# Patient Record
Sex: FEMALE | Race: BLACK OR AFRICAN AMERICAN
[De-identification: names, ages, dates, MRNs, and addresses within clinical notes are randomized per-mention and may not be internally consistent; named-entity substitution may affect disease eponyms.]

---

## 2020-05-05 ENCOUNTER — HOSPITAL ENCOUNTER (EMERGENCY)
Dept: HOSPITAL 72 - EMR | Age: 40
Discharge: HOME | End: 2020-05-05
Payer: MEDICAID

## 2020-05-05 VITALS — SYSTOLIC BLOOD PRESSURE: 146 MMHG | DIASTOLIC BLOOD PRESSURE: 88 MMHG

## 2020-05-05 VITALS — HEIGHT: 66 IN | BODY MASS INDEX: 25.88 KG/M2 | WEIGHT: 161 LBS

## 2020-05-05 VITALS — SYSTOLIC BLOOD PRESSURE: 131 MMHG | DIASTOLIC BLOOD PRESSURE: 78 MMHG

## 2020-05-05 DIAGNOSIS — F17.210: ICD-10-CM

## 2020-05-05 DIAGNOSIS — R53.1: Primary | ICD-10-CM

## 2020-05-05 DIAGNOSIS — R06.02: ICD-10-CM

## 2020-05-05 DIAGNOSIS — Z87.891: ICD-10-CM

## 2020-05-05 DIAGNOSIS — Z79.84: ICD-10-CM

## 2020-05-05 DIAGNOSIS — E11.9: ICD-10-CM

## 2020-05-05 DIAGNOSIS — Z90.710: ICD-10-CM

## 2020-05-05 LAB
ADD MANUAL DIFF: NO
ALBUMIN SERPL-MCNC: 3.9 G/DL (ref 3.4–5)
ALBUMIN/GLOB SERPL: 0.9 {RATIO} (ref 1–2.7)
ALP SERPL-CCNC: 132 U/L (ref 46–116)
ALT SERPL-CCNC: 21 U/L (ref 12–78)
ANION GAP SERPL CALC-SCNC: 14 MMOL/L (ref 5–15)
APPEARANCE UR: CLEAR
APTT PPP: (no result) S
AST SERPL-CCNC: 16 U/L (ref 15–37)
BASOPHILS NFR BLD AUTO: 1.1 % (ref 0–2)
BILIRUB SERPL-MCNC: 0.4 MG/DL (ref 0.2–1)
BUN SERPL-MCNC: 7 MG/DL (ref 7–18)
CALCIUM SERPL-MCNC: 10.2 MG/DL (ref 8.5–10.1)
CHLORIDE SERPL-SCNC: 99 MMOL/L (ref 98–107)
CO2 SERPL-SCNC: 24 MMOL/L (ref 21–32)
CREAT SERPL-MCNC: 0.8 MG/DL (ref 0.55–1.3)
EOSINOPHIL NFR BLD AUTO: 0.1 % (ref 0–3)
ERYTHROCYTE [DISTWIDTH] IN BLOOD BY AUTOMATED COUNT: 10.2 % (ref 11.6–14.8)
GLOBULIN SER-MCNC: 4.5 G/DL
GLUCOSE UR STRIP-MCNC: (no result) MG/DL
HCT VFR BLD CALC: 36.8 % (ref 37–47)
HGB BLD-MCNC: 13.4 G/DL (ref 12–16)
KETONES UR QL STRIP: (no result)
LEUKOCYTE ESTERASE UR QL STRIP: NEGATIVE
LYMPHOCYTES NFR BLD AUTO: 21.1 % (ref 20–45)
MCV RBC AUTO: 90 FL (ref 80–99)
MONOCYTES NFR BLD AUTO: 8.1 % (ref 1–10)
NEUTROPHILS NFR BLD AUTO: 69.5 % (ref 45–75)
NITRITE UR QL STRIP: NEGATIVE
PH UR STRIP: 8 [PH] (ref 4.5–8)
PLATELET # BLD: 212 K/UL (ref 150–450)
POTASSIUM SERPL-SCNC: 4.2 MMOL/L (ref 3.5–5.1)
PROT UR QL STRIP: NEGATIVE
RBC # BLD AUTO: 4.1 M/UL (ref 4.2–5.4)
SODIUM SERPL-SCNC: 137 MMOL/L (ref 136–145)
SP GR UR STRIP: 1.01 (ref 1–1.03)
UROBILINOGEN UR-MCNC: NORMAL MG/DL (ref 0–1)
WBC # BLD AUTO: 8.2 K/UL (ref 4.8–10.8)

## 2020-05-05 PROCEDURE — 80053 COMPREHEN METABOLIC PANEL: CPT

## 2020-05-05 PROCEDURE — 99284 EMERGENCY DEPT VISIT MOD MDM: CPT

## 2020-05-05 PROCEDURE — 76700 US EXAM ABDOM COMPLETE: CPT

## 2020-05-05 PROCEDURE — 96361 HYDRATE IV INFUSION ADD-ON: CPT

## 2020-05-05 PROCEDURE — 71045 X-RAY EXAM CHEST 1 VIEW: CPT

## 2020-05-05 PROCEDURE — 85025 COMPLETE CBC W/AUTO DIFF WBC: CPT

## 2020-05-05 PROCEDURE — 36415 COLL VENOUS BLD VENIPUNCTURE: CPT

## 2020-05-05 PROCEDURE — 83690 ASSAY OF LIPASE: CPT

## 2020-05-05 PROCEDURE — 93005 ELECTROCARDIOGRAM TRACING: CPT

## 2020-05-05 PROCEDURE — 96375 TX/PRO/DX INJ NEW DRUG ADDON: CPT

## 2020-05-05 PROCEDURE — 81003 URINALYSIS AUTO W/O SCOPE: CPT

## 2020-05-05 PROCEDURE — 96374 THER/PROPH/DIAG INJ IV PUSH: CPT

## 2020-05-05 NOTE — DIAGNOSTIC IMAGING REPORT
Indication: Shortness of breath

 

Technique: One view of the chest

 

Comparison: none

 

Findings: Equivocal hazy opacities are seen at both lung bases, possibly artifact of

overlying soft tissue. The lungs pleural spaces are otherwise clear. The heart size

is normal.

 

Impression: Questionable bilateral basilar hazy opacities, could represent early

infiltrates if real.

 

No acute process otherwise

## 2020-05-05 NOTE — EMERGENCY ROOM REPORT
History of Present Illness


General


Chief Complaint:  General Complaint


Source:  Patient





Present Illness


HPI


Patient is a 39-year-old female presents after increased generalized weakness 

and shortness of breath.  Patient had prior history of smoking.  States that 

she had been have increased generalized weakness over the past 1 day.  She had 

prior history of type 2 diabetes.  She reports taking metformin approximate 2 

hours prior to coming in.  She states that she is a cigarette smoker.  Denies 

any prior lung disease.  Denies any fever or recent ill contacts.  She had not 

been having any diarrhea.  She reports having one episode of vomiting.  States 

previously had a hysterectomy.


Allergies:  


Coded Allergies:  


     No Known Allergies (Unverified , 5/5/20)





COVID-19 Screening


Contact w/high risk pt:  No


Recent Travel to affected area:  No


Experienced COVID-19 symptoms?:  Yes


COVID-19 symptoms experienced:  Shortness of Breath, Cough





Patient History


Pregnant Now:  No


Reviewed Nursing Documentation:  PMH: Agreed; PSxH: Agreed





Nursing Documentation-PMH


Hx Diabetes:  Yes





Review of Systems


All Other Systems:  negative except mentioned in HPI





Physical Exam





Vital Signs








  Date Time  Temp Pulse Resp B/P (MAP) Pulse Ox O2 Delivery O2 Flow Rate FiO2


 


5/5/20 13:53 98.8 102 20 131/78 (95) 98 Room Air  








Sp02 EP Interpretation:  reviewed, normal


General Appearance:  normal inspection, well appearing, no apparent distress, 

alert, GCS 15


Head:  atraumatic


ENT:  normal ENT inspection, hearing grossly normal, normal voice


Neck:  normal inspection, full range of motion, supple, no bony tend


Respiratory:  normal inspection, lungs clear, normal breath sounds, no 

respiratory distress, no retraction, no wheezing


Cardiovascular #1:  regular rate, rhythm, no edema


Gastrointestinal:  normal inspection, normal bowel sounds, non tender, soft, no 

guarding, no hernia


Genitourinary:  no CVA tenderness


Musculoskeletal:  normal inspection, back normal, normal range of motion


Neurologic:  alert, motor strength/tone normal, oriented x3, responsive, speech 

normal, normal inspection


Psychiatric:  normal inspection, judgement/insight normal, mood/affect normal





Medical Decision Making


ER Course


Patient presented for generalized weakness and shortness of breath.  

Differential diagnosis include was not limited to dehydration, myocardial 

infarction, anemia, coronavirus infection among others.  Because of complexity 

of patient's case laboratory tests and imaging studies were ordered.





Last Vital Signs








  Date Time  Temp Pulse Resp B/P (MAP) Pulse Ox O2 Delivery O2 Flow Rate FiO2


 


5/5/20 13:53 98.8 102 20 131/78 (95) 98 Room Air  

















Silas Lomeli MD May 5, 2020 14:13

## 2020-05-05 NOTE — DIAGNOSTIC IMAGING REPORT
Indication: Abdominal pain, abnormal liver function tests

 

Technique: Gray-scale and duplex images of the upper abdomen were obtained

 

Comparison: none

 

Findings: Gallbladder is unremarkable, without stones, wall thickening, nor

pericholecystic fluid.  Sonographic Conroy's sign is negative.  Common bile duct

measures 3 mm in diameter.  No intrahepatic biliary ductal dilatation.  Liver

demonstrates normal echogenicity, no focal abnormality.   Portal vein and hepatic

veins are patent.   Pancreas is unremarkable.    Spleen is unremarkable.  Left kidney

measures 10.5 cm in length.  Right kidney measures 11.6 cm length.  Both kidneys

demonstrate normal echogenicity.  There is no hydronephrosis.   No focal abnormality

. Non-aneurysmal abdominal aorta .

 

Impression: Negative

## 2022-12-19 ENCOUNTER — HOSPITAL ENCOUNTER (EMERGENCY)
Dept: HOSPITAL 87 - ER | Age: 42
Discharge: HOME | End: 2022-12-19
Payer: MEDICAID

## 2022-12-19 VITALS — WEIGHT: 160.94 LBS | HEIGHT: 66 IN | BODY MASS INDEX: 25.86 KG/M2

## 2022-12-19 VITALS — DIASTOLIC BLOOD PRESSURE: 66 MMHG | SYSTOLIC BLOOD PRESSURE: 118 MMHG

## 2022-12-19 DIAGNOSIS — L03.031: ICD-10-CM

## 2022-12-19 DIAGNOSIS — Z79.84: ICD-10-CM

## 2022-12-19 DIAGNOSIS — E11.40: Primary | ICD-10-CM

## 2022-12-19 DIAGNOSIS — G43.909: ICD-10-CM

## 2022-12-19 LAB
BASOPHILS NFR BLD AUTO: 0.7 % (ref 0–2)
CHLORIDE SERPL-SCNC: 101 MEQ/L (ref 98–107)
EOSINOPHIL NFR BLD AUTO: 0.8 % (ref 0–5)
ERYTHROCYTE [DISTWIDTH] IN BLOOD BY AUTOMATED COUNT: 11.9 % (ref 11.6–14.6)
HCT VFR BLD AUTO: 38.1 % (ref 36–48)
HGB BLD-MCNC: 13 G/DL (ref 12–16)
LYMPHOCYTES NFR BLD AUTO: 40.5 % (ref 20–50)
MCH RBC QN AUTO: 32.2 PG (ref 28–32)
MCV RBC AUTO: 94.8 FL (ref 81–99)
MONOCYTES NFR BLD AUTO: 8.5 % (ref 2–8)
NEUTROPHILS NFR BLD AUTO: 49.5 % (ref 40–76)
PLATELET # BLD AUTO: 304 X1000/UL (ref 130–400)
PMV BLD AUTO: 8.9 FL (ref 7.4–10.4)
RBC # BLD AUTO: 4.02 MILL/UL (ref 4.2–5.4)

## 2022-12-19 PROCEDURE — 80048 BASIC METABOLIC PNL TOTAL CA: CPT

## 2022-12-19 PROCEDURE — 93970 EXTREMITY STUDY: CPT

## 2022-12-19 PROCEDURE — 99284 EMERGENCY DEPT VISIT MOD MDM: CPT

## 2022-12-19 PROCEDURE — 85025 COMPLETE CBC W/AUTO DIFF WBC: CPT

## 2022-12-19 PROCEDURE — 82962 GLUCOSE BLOOD TEST: CPT

## 2022-12-19 PROCEDURE — 36415 COLL VENOUS BLD VENIPUNCTURE: CPT

## 2022-12-19 PROCEDURE — 81025 URINE PREGNANCY TEST: CPT

## 2024-12-18 ENCOUNTER — HOSPITAL ENCOUNTER (EMERGENCY)
Dept: HOSPITAL 87 - ER | Age: 44
LOS: 1 days | Discharge: HOME | End: 2024-12-19
Payer: COMMERCIAL

## 2024-12-18 VITALS — HEIGHT: 66 IN | BODY MASS INDEX: 26.22 KG/M2 | WEIGHT: 163.14 LBS

## 2024-12-18 VITALS — TEMPERATURE: 98.7 F | OXYGEN SATURATION: 99 %

## 2024-12-18 DIAGNOSIS — I10: Primary | ICD-10-CM

## 2024-12-18 DIAGNOSIS — F41.9: ICD-10-CM

## 2024-12-18 DIAGNOSIS — E11.65: ICD-10-CM

## 2024-12-18 DIAGNOSIS — Z79.899: ICD-10-CM

## 2024-12-18 DIAGNOSIS — F17.200: ICD-10-CM

## 2024-12-18 PROCEDURE — 83880 ASSAY OF NATRIURETIC PEPTIDE: CPT

## 2024-12-18 PROCEDURE — 71045 X-RAY EXAM CHEST 1 VIEW: CPT

## 2024-12-18 PROCEDURE — 85025 COMPLETE CBC W/AUTO DIFF WBC: CPT

## 2024-12-18 PROCEDURE — 93005 ELECTROCARDIOGRAM TRACING: CPT

## 2024-12-18 PROCEDURE — 99285 EMERGENCY DEPT VISIT HI MDM: CPT

## 2024-12-18 PROCEDURE — G0480 DRUG TEST DEF 1-7 CLASSES: HCPCS

## 2024-12-18 PROCEDURE — 96374 THER/PROPH/DIAG INJ IV PUSH: CPT

## 2024-12-18 PROCEDURE — 96375 TX/PRO/DX INJ NEW DRUG ADDON: CPT

## 2024-12-18 PROCEDURE — 70450 CT HEAD/BRAIN W/O DYE: CPT

## 2024-12-18 PROCEDURE — 84484 ASSAY OF TROPONIN QUANT: CPT

## 2024-12-18 PROCEDURE — 80048 BASIC METABOLIC PNL TOTAL CA: CPT

## 2024-12-18 PROCEDURE — 36415 COLL VENOUS BLD VENIPUNCTURE: CPT

## 2024-12-18 PROCEDURE — 85379 FIBRIN DEGRADATION QUANT: CPT

## 2024-12-18 PROCEDURE — 80320 DRUG SCREEN QUANTALCOHOLS: CPT

## 2024-12-19 VITALS
HEART RATE: 81 BPM | DIASTOLIC BLOOD PRESSURE: 82 MMHG | SYSTOLIC BLOOD PRESSURE: 120 MMHG | RESPIRATION RATE: 18 BRPM | OXYGEN SATURATION: 100 %

## 2024-12-19 LAB
BASOPHILS NFR BLD AUTO: 0.7 % (ref 0–2)
BUN SERPL-MCNC: 12 MG/DL (ref 9–23)
CALCIUM SERPL-MCNC: 8.7 MG/DL (ref 8.7–10.4)
CARDIAC TROPONIN I PNL SERPL HS: 4 NG/L (ref 3–34)
CARDIAC TROPONIN I PNL SERPL HS: < 4 NG/L (ref 3–34)
CHLORIDE SERPL-SCNC: 102 MEQ/L (ref 98–107)
CO2 SERPL-SCNC: 23 MEQ/L (ref 21–32)
CREAT SERPL-MCNC: 1 MG/DL (ref 0.6–1)
EOSINOPHIL NFR BLD AUTO: 1.1 % (ref 0–5)
ERYTHROCYTE [DISTWIDTH] IN BLOOD BY AUTOMATED COUNT: 11.4 % (ref 11.6–14.6)
ETHANOL SERPL-MCNC: < 10 MG/DL (ref ?–10)
GLUCOSE SERPL-MCNC: 439 MG/DL (ref 70–105)
HCT VFR BLD AUTO: 31.4 % (ref 36–48)
HGB BLD-MCNC: 10.9 G/DL (ref 12–16)
LYMPHOCYTES NFR BLD AUTO: 49.4 % (ref 20–50)
MCH RBC QN AUTO: 32.5 PG (ref 28–32)
MCHC RBC AUTO-ENTMCNC: 34.8 G/DL (ref 31–37)
MCV RBC AUTO: 93.2 FL (ref 81–99)
MONOCYTES NFR BLD AUTO: 10 % (ref 2–8)
NEUTROPHILS NFR BLD AUTO: 38.8 % (ref 40–76)
PLATELET # BLD AUTO: 197 X1000/UL (ref 130–400)
PMV BLD AUTO: 9.7 FL (ref 7.4–10.4)
POTASSIUM SERPL-SCNC: 3.7 MEQ/L (ref 3.5–5.1)
RBC # BLD AUTO: 3.37 MILL/UL (ref 4.2–5.4)
SODIUM SERPL-SCNC: 135 MEQ/L (ref 136–145)
WBC # BLD: 5.6 X1000/UL (ref 4.5–11)

## 2024-12-19 RX ADMIN — Medication STA MG: at 02:09

## 2024-12-19 RX ADMIN — Medication NR MG: at 02:15

## 2024-12-19 RX ADMIN — MORPHINE SULFATE NR MG: 4 INJECTION, SOLUTION INTRAMUSCULAR; INTRAVENOUS at 02:10

## 2025-04-15 ENCOUNTER — HOSPITAL ENCOUNTER (EMERGENCY)
Dept: HOSPITAL 87 - ER | Age: 45
Discharge: HOME | End: 2025-04-15
Payer: COMMERCIAL

## 2025-04-15 VITALS
SYSTOLIC BLOOD PRESSURE: 143 MMHG | HEART RATE: 113 BPM | TEMPERATURE: 96.98 F | RESPIRATION RATE: 15 BRPM | OXYGEN SATURATION: 100 % | DIASTOLIC BLOOD PRESSURE: 81 MMHG

## 2025-04-15 VITALS — OXYGEN SATURATION: 98 %

## 2025-04-15 VITALS — WEIGHT: 149.91 LBS | HEIGHT: 64 IN | BODY MASS INDEX: 25.59 KG/M2

## 2025-04-15 DIAGNOSIS — F10.129: ICD-10-CM

## 2025-04-15 DIAGNOSIS — F41.9: ICD-10-CM

## 2025-04-15 DIAGNOSIS — I10: ICD-10-CM

## 2025-04-15 DIAGNOSIS — Z79.84: ICD-10-CM
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
## 2025-04-15 DIAGNOSIS — Z79.4: ICD-10-CM

## 2025-04-15 DIAGNOSIS — Z79.899: ICD-10-CM

## 2025-04-15 DIAGNOSIS — E11.65: Primary | ICD-10-CM

## 2025-04-15 LAB
ALBUMIN SERPL BCP-MCNC: 3.5 G/DL (ref 3.2–4.8)
ALT SERPL W P-5'-P-CCNC: 18 IU/L (ref 10–49)
AMPHETAMINES UR QL SCN: NEGATIVE
APPEARANCE UR: CLEAR
AST SERPL W P-5'-P-CCNC: 16 IU/L (ref ?–34)
BACTERIA #/AREA URNS AUTO: (no result) /[HPF]
BARBITURATES UR QL SCN: NEGATIVE
BASE EXCESS BLDA CALC-SCNC: -9.9 MMOL/L (ref -2–3)
BASOPHILS NFR BLD AUTO: 0.4 % (ref 0–2)
BENZODIAZ UR QL SCN: NEGATIVE
BILIRUB DIRECT SERPL-MCNC: < 0.1 MG/DL (ref ?–3)
BILIRUB SERPL-MCNC: 0.2 MG/DL (ref 0.1–1)
BUN SERPL-MCNC: 8 MG/DL (ref 9–23)
BZE UR QL SCN: NEGATIVE
CALCIUM SERPL-MCNC: 8.7 MG/DL (ref 8.7–10.4)
CANNABINOIDS UR QL SCN: NEGATIVE
CARDIAC TROPONIN I PNL SERPL HS: 16 NG/L (ref 3–34)
CHLORIDE SERPL-SCNC: 110 MEQ/L (ref 98–107)
CO2 SERPL-SCNC: 15 MEQ/L (ref 21–32)
COLOR UR: (no result)
CREAT SERPL-MCNC: 0.7 MG/DL (ref 0.6–1)
DO-HGB MFR BLDA: 4.7 % (ref 0–5)
EOSINOPHIL NFR BLD AUTO: 0.8 % (ref 0–5)
ERYTHROCYTE [DISTWIDTH] IN BLOOD BY AUTOMATED COUNT: 12.6 % (ref 11.6–14.6)
ETHANOL SERPL-MCNC: 89 MG/DL (ref ?–10)
GLUCOSE SERPL-MCNC: 348 MG/DL (ref 70–105)
GLUCOSE UR STRIP.AUTO-MCNC: (no result) MG/DL
HCG SERPL QL: NEGATIVE
HCO3 BLDA-SCNC: 14.8 MMOL/L (ref 21–28)
HCT VFR BLD AUTO: 27.5 % (ref 36–48)
HGB BLD-MCNC: 9.2 G/DL (ref 12–16)
HGB BLDA OXIMETRY-MCNC: 9.8 G/DL (ref 12–16)
HGB UR QL STRIP: NEGATIVE
INHALED O2 CONCENTRATION: 21 %
INR PPP: 0.9
KETONES UR STRIP-MCNC: (no result) MG/DL
LEUKOCYTE ESTERASE UR QL STRIP: NEGATIVE
LYMPHOCYTES NFR BLD AUTO: 38.3 % (ref 20–50)
MCH RBC QN AUTO: 30.5 PG (ref 28–32)
MCHC RBC AUTO-ENTMCNC: 33.3 G/DL (ref 31–37)
MCV RBC AUTO: 91.6 FL (ref 81–99)
MDMA UR QL SCN: NEGATIVE
METHADONE UR QL SCN: NEGATIVE
METHGB MFR BLDA: 0.3 % (ref 0.5–1.5)
MONOCYTES NFR BLD AUTO: 9.4 % (ref 2–8)
NEUTROPHILS NFR BLD AUTO: 51.1 % (ref 40–76)
NITRITE UR QL STRIP: NEGATIVE
OPIATES UR QL SCN: NEGATIVE
PCO2 TEMP ADJ BLDA: 28.7 MMHG (ref 32–45)
PCP UR QL SCN: NEGATIVE
PH UR STRIP: 5.5 [PH] (ref 4.5–8)
PLATELET # BLD AUTO: 229 X1000/UL (ref 130–400)
PMV BLD AUTO: 8.6 FL (ref 7.4–10.4)
PO2 TEMP ADJ BLDA: 86.4 MMHG (ref 83–108)
POTASSIUM SERPL-SCNC: 3.7 MEQ/L (ref 3.5–5.1)
PROT SERPL-MCNC: 6.5 G/DL (ref 6–8.3)
PROT UR QL STRIP: NEGATIVE
PROTHROMBIN TIME: 9.5 SEC (ref 9.6–11)
RBC #/AREA URNS AUTO: (no result) /HPF (ref 0–2)
SAO2 % BLDA: 95.2 % (ref 94–98)
SODIUM SERPL-SCNC: 138 MEQ/L (ref 136–145)
SP GR UR STRIP: 1.01 (ref 1–1.03)
SPECIMEN DRAWN FROM PATIENT: (no result)
SQUAMOUS #/AREA URNS AUTO: (no result) /LPF
UROBILINOGEN UR STRIP-MCNC: 0.2 E.U./DL (ref 0.2–1)
VENTILATION MODE VENT: (no result)
WBC # BLD: 6.4 X1000/UL (ref 4.5–11)
WBC #/AREA URNS AUTO: (no result) /HPF (ref 0–2)

## 2025-04-15 PROCEDURE — 96361 HYDRATE IV INFUSION ADD-ON: CPT

## 2025-04-15 PROCEDURE — 84484 ASSAY OF TROPONIN QUANT: CPT

## 2025-04-15 PROCEDURE — 82805 BLOOD GASES W/O2 SATURATION: CPT

## 2025-04-15 PROCEDURE — 82962 GLUCOSE BLOOD TEST: CPT

## 2025-04-15 PROCEDURE — 82375 ASSAY CARBOXYHB QUANT: CPT

## 2025-04-15 PROCEDURE — 84703 CHORIONIC GONADOTROPIN ASSAY: CPT

## 2025-04-15 PROCEDURE — 36415 COLL VENOUS BLD VENIPUNCTURE: CPT

## 2025-04-15 PROCEDURE — 93005 ELECTROCARDIOGRAM TRACING: CPT

## 2025-04-15 PROCEDURE — 80048 BASIC METABOLIC PNL TOTAL CA: CPT

## 2025-04-15 PROCEDURE — 96375 TX/PRO/DX INJ NEW DRUG ADDON: CPT

## 2025-04-15 PROCEDURE — 83690 ASSAY OF LIPASE: CPT

## 2025-04-15 PROCEDURE — G0480 DRUG TEST DEF 1-7 CLASSES: HCPCS

## 2025-04-15 PROCEDURE — 82010 KETONE BODYS QUAN: CPT

## 2025-04-15 PROCEDURE — 81025 URINE PREGNANCY TEST: CPT

## 2025-04-15 PROCEDURE — 96372 THER/PROPH/DIAG INJ SC/IM: CPT

## 2025-04-15 PROCEDURE — 85025 COMPLETE CBC W/AUTO DIFF WBC: CPT

## 2025-04-15 PROCEDURE — 71045 X-RAY EXAM CHEST 1 VIEW: CPT

## 2025-04-15 PROCEDURE — 80305 DRUG TEST PRSMV DIR OPT OBS: CPT

## 2025-04-15 PROCEDURE — 70450 CT HEAD/BRAIN W/O DYE: CPT

## 2025-04-15 PROCEDURE — 99285 EMERGENCY DEPT VISIT HI MDM: CPT

## 2025-04-15 PROCEDURE — 85610 PROTHROMBIN TIME: CPT

## 2025-04-15 PROCEDURE — 80320 DRUG SCREEN QUANTALCOHOLS: CPT

## 2025-04-15 PROCEDURE — 81003 URINALYSIS AUTO W/O SCOPE: CPT

## 2025-04-15 PROCEDURE — 96374 THER/PROPH/DIAG INJ IV PUSH: CPT

## 2025-04-15 PROCEDURE — 80076 HEPATIC FUNCTION PANEL: CPT

## 2025-04-15 PROCEDURE — 36600 WITHDRAWAL OF ARTERIAL BLOOD: CPT

## 2025-04-15 RX ADMIN — INSULIN LISPRO STA UNIT: 100 INJECTION, SOLUTION INTRAVENOUS; SUBCUTANEOUS at 17:24

## 2025-04-15 RX ADMIN — KETOROLAC TROMETHAMINE ONE MG: 30 INJECTION, SOLUTION INTRAMUSCULAR at 16:04

## 2025-04-15 RX ADMIN — Medication STA MG: at 15:37

## 2025-04-15 RX ADMIN — SODIUM CHLORIDE ONE MG: 9 INJECTION, SOLUTION INTRAVENOUS at 15:36

## 2025-04-15 RX ADMIN — DIPHENHYDRAMINE HYDROCHLORIDE ONE MG: 50 INJECTION, SOLUTION INTRAMUSCULAR; INTRAVENOUS at 15:36

## 2025-04-15 RX ADMIN — DEXTROSE ONE MLS/HR: 5 SOLUTION INTRAVENOUS at 15:26

## 2025-04-15 RX ADMIN — DEXTROSE ONE MLS/HR: 5 SOLUTION INTRAVENOUS at 17:29
